# Patient Record
Sex: FEMALE | Race: BLACK OR AFRICAN AMERICAN | Employment: UNEMPLOYED | URBAN - METROPOLITAN AREA
[De-identification: names, ages, dates, MRNs, and addresses within clinical notes are randomized per-mention and may not be internally consistent; named-entity substitution may affect disease eponyms.]

---

## 2017-05-01 ENCOUNTER — HOSPITAL ENCOUNTER (EMERGENCY)
Age: 22
Discharge: HOME | End: 2017-05-01
Attending: EMERGENCY MEDICINE | Admitting: EMERGENCY MEDICINE
Payer: COMMERCIAL

## 2017-05-01 VITALS
HEIGHT: 65 IN | SYSTOLIC BLOOD PRESSURE: 131 MMHG | DIASTOLIC BLOOD PRESSURE: 85 MMHG | TEMPERATURE: 99.9 F | WEIGHT: 200 LBS | HEART RATE: 80 BPM | RESPIRATION RATE: 12 BRPM | OXYGEN SATURATION: 99 % | BODY MASS INDEX: 33.32 KG/M2

## 2017-05-01 DIAGNOSIS — J02.9 VIRAL PHARYNGITIS: Primary | ICD-10-CM

## 2017-05-01 PROCEDURE — 99283 EMERGENCY DEPT VISIT LOW MDM: CPT

## 2017-05-01 PROCEDURE — 87081 CULTURE SCREEN ONLY: CPT | Performed by: EMERGENCY MEDICINE

## 2017-05-01 PROCEDURE — 74011250637 HC RX REV CODE- 250/637: Performed by: EMERGENCY MEDICINE

## 2017-05-01 RX ORDER — IBUPROFEN 600 MG/1
600 TABLET ORAL
Status: COMPLETED | OUTPATIENT
Start: 2017-05-01 | End: 2017-05-01

## 2017-05-01 RX ORDER — DEXAMETHASONE SODIUM PHOSPHATE 4 MG/ML
10 INJECTION, SOLUTION INTRA-ARTICULAR; INTRALESIONAL; INTRAMUSCULAR; INTRAVENOUS; SOFT TISSUE ONCE
Status: COMPLETED | OUTPATIENT
Start: 2017-05-01 | End: 2017-05-01

## 2017-05-01 RX ORDER — IBUPROFEN 600 MG/1
600 TABLET ORAL
Qty: 20 TAB | Refills: 0 | Status: SHIPPED | OUTPATIENT
Start: 2017-05-01

## 2017-05-01 RX ADMIN — DEXAMETHASONE SODIUM PHOSPHATE 10 MG: 4 INJECTION, SOLUTION INTRAMUSCULAR; INTRAVENOUS at 04:49

## 2017-05-01 RX ADMIN — IBUPROFEN 600 MG: 600 TABLET ORAL at 04:49

## 2017-05-01 NOTE — ED NOTES
I have reviewed discharge instructions with the patient. The patient verbalized understanding. Medication teaching given, to include name, dose, action, and side effects. Patient verbalized understanding of medications. Encouraged patient to voice any concerns with reassurance provided. Patient armband removed and shredded    Patient Discharged in stable condition. Patient is awake, alert and oriented x 4.

## 2017-05-01 NOTE — ED PROVIDER NOTES
HPI Comments: 25 y/o female with no PMH presents with sore throat x1 day. Denies cough. Admits to some diarrhea, denies abd pain or vomiting. Has not taken any meds. No sick contacts. No other complaints. Patient is a 24 y.o. female presenting with sore throat. Sore Throat    Associated symptoms include diarrhea and ear pain. Pertinent negatives include no vomiting, no shortness of breath and no trouble swallowing. Past Medical History:   Diagnosis Date    Pneumonia        No past surgical history on file. Family History:   Problem Relation Age of Onset    Hypertension Other     Diabetes Other        Social History     Social History    Marital status: SINGLE     Spouse name: N/A    Number of children: N/A    Years of education: N/A     Occupational History    Not on file. Social History Main Topics    Smoking status: Current Some Day Smoker    Smokeless tobacco: Not on file    Alcohol use No    Drug use: No      Comment: former user    Sexual activity: Yes     Birth control/ protection: None     Other Topics Concern    Not on file     Social History Narrative         ALLERGIES: Penicillins    Review of Systems   Constitutional: Negative for fever. HENT: Positive for ear pain and sore throat. Negative for trouble swallowing. Respiratory: Negative for shortness of breath. Cardiovascular: Negative for chest pain. Gastrointestinal: Positive for diarrhea. Negative for nausea and vomiting. All other systems reviewed and are negative. Vitals:    05/01/17 0414   BP: 131/85   Pulse: 80   Resp: 12   Temp: 99.9 °F (37.7 °C)   SpO2: 99%   Weight: 90.7 kg (200 lb)   Height: 5' 5\" (1.651 m)            Physical Exam   Constitutional: She is oriented to person, place, and time. She appears well-developed and well-nourished. HENT:   Head: Normocephalic and atraumatic. Uvula midline  Moderate tonsillar edema, erythema, no exudates    TM clear BL   Neck: Neck supple.  No JVD present. Musculoskeletal: She exhibits no edema. Lymphadenopathy:     She has no cervical adenopathy. Neurological: She is alert and oriented to person, place, and time. Skin: Skin is warm and dry. No erythema. MDM  Number of Diagnoses or Management Options  Diagnosis management comments: 25 y/o female presents with sore throat  Decadron and nsaids for pain  Rapid strep  Pt well appearing, no drooling, no sign of serious infection. Amount and/or Complexity of Data Reviewed  Clinical lab tests: ordered and reviewed      ED Course       Procedures    Strep negative,   Discussed results with pt. discussed supportive care, expected course of illness. Stable for dc home.

## 2017-05-01 NOTE — LETTER
[address] [place] [address] [facility] 
[address] [facility] [address] [phone] Work/School Note Date: 5/1/2017 To Whom It May concern: 
 
Jesus Alberto Dorsey was seen and treated today in the emergency room by the following provider(s): 
Attending Provider: Sandra Saravia 89 may return to work on 5/3/2017. Sincerely, Praveena Mackay RN

## 2017-05-03 LAB
B-HEM STREP THROAT QL CULT: NEGATIVE
BACTERIA SPEC CULT: NORMAL
SERVICE CMNT-IMP: NORMAL

## 2018-09-04 ENCOUNTER — HOSPITAL ENCOUNTER (EMERGENCY)
Age: 23
Discharge: HOME | End: 2018-09-04
Attending: OBSTETRICS & GYNECOLOGY | Admitting: OBSTETRICS & GYNECOLOGY
Payer: COMMERCIAL

## 2018-09-04 VITALS
TEMPERATURE: 98.6 F | BODY MASS INDEX: 34.99 KG/M2 | WEIGHT: 210 LBS | HEART RATE: 93 BPM | SYSTOLIC BLOOD PRESSURE: 136 MMHG | HEIGHT: 65 IN | DIASTOLIC BLOOD PRESSURE: 82 MMHG

## 2018-09-04 PROBLEM — Z34.90 PREGNANCY: Status: ACTIVE | Noted: 2018-09-04

## 2018-09-04 LAB
A1 MICROGLOB PLACENTAL VAG QL: NEGATIVE
APPEARANCE UR: ABNORMAL
BILIRUB UR QL: NEGATIVE
COLOR UR: ABNORMAL
CONTROL LINE PRESENT?: NORMAL
EXPIRATION DATE: NORMAL
GLUCOSE UR QL STRIP.AUTO: NEGATIVE MG/DL
INTERNAL NEGATIVE CONTROL: NORMAL
KETONES UR-MCNC: NEGATIVE MG/DL
KIT LOT NO.: NORMAL
LEUKOCYTE ESTERASE UR QL STRIP: ABNORMAL
NITRITE UR QL: NEGATIVE
PH UR: 7 [PH] (ref 5–9)
PROT UR QL: 100 MG/DL
RBC # UR STRIP: NEGATIVE /UL
SERVICE CMNT-IMP: ABNORMAL
SERVICE CMNT-IMP: NORMAL
SP GR UR: >1.03 (ref 1–1.02)
UROBILINOGEN UR QL: 0.2 EU/DL (ref 0.2–1)
WET PREP GENITAL: NORMAL

## 2018-09-04 PROCEDURE — 59025 FETAL NON-STRESS TEST: CPT

## 2018-09-04 PROCEDURE — 99285 EMERGENCY DEPT VISIT HI MDM: CPT

## 2018-09-04 PROCEDURE — 87491 CHLMYD TRACH DNA AMP PROBE: CPT | Performed by: OBSTETRICS & GYNECOLOGY

## 2018-09-04 PROCEDURE — 84112 EVAL AMNIOTIC FLUID PROTEIN: CPT | Performed by: OBSTETRICS & GYNECOLOGY

## 2018-09-04 PROCEDURE — 81003 URINALYSIS AUTO W/O SCOPE: CPT

## 2018-09-04 PROCEDURE — 87210 SMEAR WET MOUNT SALINE/INK: CPT | Performed by: OBSTETRICS & GYNECOLOGY

## 2018-09-04 RX ORDER — METRONIDAZOLE 7.5 MG/G
1 GEL VAGINAL
Qty: 187.5 MG | Refills: 0 | Status: SHIPPED | OUTPATIENT
Start: 2018-09-04 | End: 2018-09-09

## 2018-09-04 NOTE — PROGRESS NOTES
Pt is a 25 yr old , arrived by EMS, for possible ruptured membranes. She notes mild irregular. ctxs, no VB, no LOF, positive FM. Her prenatal course has been consistent with [facility]. Has an appointment tomorrow with her OB. Hammett and EFM placed with positive FHT. Abdomen is soft and non tender to palpation. Actimprom was negative. Cervical exam is closed cervix. 1300: Dr Shad Quispe at bedside doing a pelvic exam. 
[phone]: Patient up to restroom. 1427: Dr Shad Quispe at bedside. Patient able to be D/C home and to follow up with scheduled OB appointment tommorow. 1435: I have reviewed discharge instructions with the patient and spouse. The patient and spouse verbalized understanding. Al discharge education and paperwork given, patient had no questions at this time. 1449: Patient ambulated off unit in no acute distress

## 2018-09-04 NOTE — DISCHARGE INSTRUCTIONS
Learning About Starting to Breastfeed  Planning ahead    Before your baby is born, plan ahead. Learn all you can about breastfeeding. This helps make breastfeeding easier. · Early in your pregnancy, talk to your doctor or midwife about breastfeeding. · Learn the basics before your baby is born. The staff at hospitals and birthing centers can help you find a lactation specialist. This person is often a nurse who has been trained to teach and advise women about breastfeeding. Or you can take a breastfeeding class. · Plan ahead for times when you will need help after your baby is born. Many women get help from friends and family. Some join a support group to talk to other moms who breastfeed. · Buy the equipment you'll need. Examples are breast pads, nipple cream, extra pillows, and nursing bras. Find out about breast pumps too. Getting help from your hospital or birthing center  It's important to have support from the doctors, nurses, and hospital staff who care for you and your baby. Before it's time for you to give birth, ask about the breastfeeding policies at your hospital or birthing center. Look for a hospital or birthing center that has policies for:  · \"Rooming in. \" This policy encourages you to have your baby in the room with you. It can allow you to breastfeed more often. · Supplemental feedings. Tell the staff that your baby is to get only your breast milk from birth. If staff feed your baby water, sugar solution, or formula right after birth without a medical reason, it may make it harder for you to breastfeed. · Pacifiers or artificial nipples. Staff should not give your  these items without your permission. They may interfere with breastfeeding. · Follow-up. Find out if your hospital can help you with breastfeeding issues after you go home. See if you can get information on support groups or other contacts.  They might help if you need help setting up and staying with your breastfeeding routine. Your first feeding  It's best to start breastfeeding within 1 hour of birth. For each feeding, you go through these basic steps:  · Get ready for the feeding. Be calm and relaxed, and try not to be distracted. Get some water or juice for yourself. Use two or three pillows to help support your baby while he or she is nursing. · Find a breastfeeding position that is comfortable for you and your baby. Examples are the cradle and the football positions. Make sure the baby's head and chest are lined up straight and facing your breast. It's best to switch which breast you start with each time. · Get the baby latched on well. Your baby's mouth needs to be wide open, like a yawn, so you may need to gently touch the middle of your baby's lower lip. When your baby's mouth is open wide, quickly bring the baby onto your nipple and areola. The areola is the dark [place] around your nipple. · Provide a complete feeding. Let your baby nurse for at least 15 minutes. Be sure to burp your baby after each breast.  In the first days after birth, your breasts make a thick, yellow liquid called colostrum. This liquid gives your baby nutrients and antibodies against infection. It is all that babies need at first. Your breasts will fill with milk a few days after the birth. Talk to your doctor, midwife, or lactation specialist right away if you are having problems and aren't sure what to do. How often to breastfeed  Plan to breastfeed your baby on demand rather than setting a strict schedule. For the first few days, be prepared to breastfeed every 1 to 3 hours. That often works out to about 8 to 12 times in a 24-hour period. Wake a sleeping baby to feed, if you need to. If you breastfeed more often, it will help your breasts to produce more milk. After you go home  After you're home, don't be afraid to call your doctor, midwife, or lactation specialist with questions.  That's true even if you don't know what's bothering you. They are used to parents of newborns calling. They can help you figure out if there is a problem, and if so, how to fix it. Plan for times when you will be apart from your baby. Use a breast pump to collect breast milk ahead of time. You can store milk in the refrigerator or freezer. Then it's ready when someone else will be taking care of your baby. Breastfeeding is a learned skill that gets easier over time. You are more likely to succeed if you plan ahead, learn the basic techniques, and know where to get help and support. Where can you learn more? Go to http://tanner-christen.info/. Enter M190 in the search box to learn more about \"Learning About Starting to Breastfeed. \"  Current as of: November 21, 2017  Content Version: 11.7  © 6500-1641 [employer]. Care instructions adapted under license by [employer] (which disclaims liability or warranty for this information). If you have questions about a medical condition or this instruction, always ask your healthcare professional. [place] [address] any warranty or liability for your use of this information.     Prenatal Discharge Instructions  Follow up appointment: WITH regular OB    Diet:     Activity:     Medications:     Call your physician or return to Labor and Delivery if any of the following symptoms occur:   Signs of labor (contractions every 5-10 minutes for 1 hour)   Vaginal bleeding   Rupture of amniotic membranes (water breaks)   Fever   Decreased fetal movement (less than 5-10 movements in 1 hour)

## 2018-09-04 NOTE — H&P
History & Physical 
 
Name: Derick Fuentes MRN: [id number]  SSN: [id number] YOB: 1995  Age: 25 y.o. Sex: female Subjective:  
 
Estimated Date of Delivery: None noted. OB History  Para Term  AB Living  
 1 SAB TAB Ectopic Molar Multiple Live Births Ms. Jessi Heath presents for evaluation of pregnancy at 32.4wks for irregular contractions, and r/o rupture of membranes. Pt states she was at work when started having low back and abdominal pain, and menstrual cramps. She also noted that her underwear was wet. She c/o white liquidly vaginal discharge. She td bleeding. +FM. Prenatal course was normal.She sees [facility] for prenatal care. Please see prenatal records for details. Past Medical History:  
Diagnosis Date  Pneumonia No past surgical history on file. Allergies Allergen Reactions  Penicillins Hives and Swelling Prior to Admission medications Medication Sig Start Date End Date Taking? Authorizing Provider  
multivit-mins no. 63-iron-folic (M-VIT) 27 mg iron- 1 mg tab Take  by mouth. Yes Historical Provider  
metroNIDAZOLE (METROGEL) 0.75 % vaginal gel Insert 1 Applicator into vagina nightly for 5 days. 18 Yes Hubert Justice MD  
ibuprofen (MOTRIN) 600 mg tablet Take 1 Tab by mouth every six (6) hours as needed for Pain. 17   Erich Isaacs DO  
etonogestrel (NEXPLANON) 68 mg impl by SubDERmal route. Brianne Clark MD  
fluticasone (FLONASE) 50 mcg/actuation nasal spray 2 Sprays by Both Nostrils route daily. Phys MD Eduardo  
dextromethorphan-guaiFENesin (ROBITUSSIN-DM)  mg/5 mL syrup Take 10 mL by mouth every six (6) hours as needed for Cough. 16   Eliezer Quintanilla PA-C Social History Occupational History  Not on file. Social History Main Topics  Smoking status: Current Some Day Smoker  Smokeless tobacco: Not on file  Alcohol use No  
  Drug use: No  
   Comment: former user  Sexual activity: Yes Birth control/ protection: None Family History Problem Relation Age of Onset  Hypertension Other  Diabetes Other Review of Systems: A comprehensive review of systems was negative except for that written in the HPI. Objective:  
 
Vitals: 
Vitals:  
 09/04/18 1201 09/04/18 1202 BP:  130/83 Pulse:  98 Temp:  98.6 °F (37 °C) Weight: 210 lb (95.3 kg) Height: 5' 5\" (1.651 m) Physical Exam: 
Patient without distress. Heart: Regular rate and rhythm, S1S2 present or without murmur or extra heart sounds Lung: clear to auscultation throughout lung fields, no wheezes, no rales, no rhonchi and normal respiratory effort Abdomen: soft, nontender, nondistended Fundus: soft and non tender Cervical Exam: Closed/Thick/High x 2 exams,  Thin white discharge noted on exam.  
Lower Extremities: no calf tenderness Membranes:  Intact, Actim prom neg Fetal Heart Rate: Reactive Baseline: 145 per minute Variability: moderate Accelerations: yes Decelerations: none Uterine contractions: none Prenatal Labs:  
No results found for: RUBELLAEXT, GRBSEXT, HBSAGEXT, HIVEXT, RPREXT, GONNOEXT, CHLAMEXT Recent Results (from the past 24 hour(s)) RUPTURE OF FETAL MEMBRANES, POC Collection Time: 09/04/18 12:20 PM  
Result Value Ref Range Rupture of fetal membrane Negative Negative Control line present? Acceptable Internal negative control Acceptable Kit Lot No. [id number] Expiration date 07/24/19 POC URINE MACROSCOPIC Collection Time: 09/04/18 12:36 PM  
Result Value Ref Range Color OTHER Appearance CLOUDY Spec. gravity (POC) >1.030 (H) 1.001 - 1.023  
 pH, urine  (POC) 7.0 5.0 - 9.0 Protein (POC) 100 (A) NEG mg/dL Glucose, urine (POC) NEGATIVE  NEG mg/dL Ketones (POC) NEGATIVE  NEG mg/dL Bilirubin (POC) NEGATIVE  NEG  Blood (POC) NEGATIVE  NEG    
 Urobilinogen (POC) 0.2 0.2 - 1.0 EU/dL Nitrite (POC) NEGATIVE  NEG Leukocyte esterase (POC) SMALL (A) NEG Performed by Milan Pearce WET PREP Collection Time: 09/04/18  1:25 PM  
Result Value Ref Range Special Requests: NO SPECIAL REQUESTS Wet prep NO TRICHOMONAS SEEN Wet prep NO YEAST SEEN Wet prep MANY 
CLUE CELLS PRESENT Assessment/Plan:  
 
Patient Active Problem List  
Diagnosis Code  Pregnancy Z34.90 Plan: Tory Luana No evidence of labor Ruled out for rupture Wet prep: +BV--->meds sent to pharmacy Lower abdominal pain/back pain: discussed tylenol, warm compresses, and maternity belt Reassuring fetal status D/C home with PTL precautions F/u 1 days with primary OB, pt has an appointment tomorrow.   
 
Signed By:  Susie Tello MD   
 September 4, 2018 2:27 PM

## 2018-09-04 NOTE — IP AVS SNAPSHOT
[address] Nnamdi Nixon 17 Patient: Wisam Smith MRN: [id number] :1995 A check pradeep indicates which time of day the medication should be taken. My Medications START taking these medications Instructions Each Dose to Equal  
 Morning Noon Evening Bedtime  
 metroNIDAZOLE 0.75 % vaginal gel Commonly known as:  Sabina Joe Your last dose was: Your next dose is: Insert 1 Applicator into vagina nightly for 5 days. 1 Applicator ASK your doctor about these medications Instructions Each Dose to Equal  
 Morning Noon Evening Bedtime  
 dextromethorphan-guaiFENesin  mg/5 mL syrup Commonly known as:  ROBITUSSIN-DM Your last dose was: Your next dose is: Take 10 mL by mouth every six (6) hours as needed for Cough. 10 mL FLONASE 50 mcg/actuation nasal spray Generic drug:  fluticasone Your last dose was: Your next dose is: 2 Sprays by Both Nostrils route daily. 2 Spray  
    
   
   
   
  
 ibuprofen 600 mg tablet Commonly known as:  MOTRIN Your last dose was: Your next dose is: Take 1 Tab by mouth every six (6) hours as needed for Pain. 600 mg  
    
   
   
   
  
 M-VIT 27 mg iron- 1 mg Tab Generic drug:  multivit-mins no. 63-iron-folic Your last dose was: Your next dose is: Take  by mouth. NEXPLANON 68 mg Impl Generic drug:  etonogestrel Your last dose was: Your next dose is:    
   
   
 by SubDERmal route. Where to Get Your Medications These medications were sent to [facility] [address], [address] [facility] [address] Hours:  24-hours Phone:  [phone] metroNIDAZOLE 0.75 % vaginal gel

## 2018-09-04 NOTE — IP AVS SNAPSHOT
[address] Patient: Vladislav Zaragoza MRN: [id number] :1995 About your hospitalization You were admitted on:  N/A You last received care in the:  [facility] 2 [address] [facility] You were discharged on:  2018 Why you were hospitalized Your primary diagnosis was:  Not on File Your diagnoses also included:  Pregnancy Follow-up Information Follow up With Details Comments Contact Info DEBBIE Caraballo [address] [place] [address] [phone] 
[phone] Discharge Orders None A check pradeep indicates which time of day the medication should be taken. My Medications START taking these medications Instructions Each Dose to Equal  
 Morning Noon Evening Bedtime  
 metroNIDAZOLE 0.75 % vaginal gel Commonly known as:  Hermon Peals Your last dose was: Your next dose is: Insert 1 Applicator into vagina nightly for 5 days. 1 Applicator ASK your doctor about these medications Instructions Each Dose to Equal  
 Morning Noon Evening Bedtime  
 dextromethorphan-guaiFENesin  mg/5 mL syrup Commonly known as:  ROBITUSSIN-DM Your last dose was: Your next dose is: Take 10 mL by mouth every six (6) hours as needed for Cough. 10 mL FLONASE 50 mcg/actuation nasal spray Generic drug:  fluticasone Your last dose was: Your next dose is: 2 Sprays by Both Nostrils route daily. 2 Spray  
    
   
   
   
  
 ibuprofen 600 mg tablet Commonly known as:  MOTRIN Your last dose was: Your next dose is: Take 1 Tab by mouth every six (6) hours as needed for Pain. 600 mg  
    
   
   
   
  
 M-VIT 27 mg iron- 1 mg Tab Generic drug:  multivit-mins no. 63-iron-folic Your last dose was: Your next dose is: Take  by mouth. NEXPLANON 68 mg Impl Generic drug:  etonogestrel Your last dose was: Your next dose is:    
   
   
 by SubDERmal route. Where to Get Your Medications These medications were sent to [facility] [address], [address] Hours:  24-hours Phone:  [phone]  
  metroNIDAZOLE 0.75 % vaginal gel Discharge Instructions Learning About Starting to Breastfeed Planning ahead Before your baby is born, plan ahead. Learn all you can about breastfeeding. This helps make breastfeeding easier. · Early in your pregnancy, talk to your doctor or midwife about breastfeeding. · Learn the basics before your baby is born. The staff at hospitals and birthing centers can help you find a lactation specialist. This person is often a nurse who has been trained to teach and advise women about breastfeeding. Or you can take a breastfeeding class. · Plan ahead for times when you will need help after your baby is born. Many women get help from friends and family. Some join a support group to talk to other moms who breastfeed. · Buy the equipment you'll need. Examples are breast pads, nipple cream, extra pillows, and nursing bras. Find out about breast pumps too. Getting help from your hospital or birthing center It's important to have support from the doctors, nurses, and hospital staff who care for you and your baby. Before it's time for you to give birth, ask about the breastfeeding policies at your hospital or birthing center. Look for a hospital or birthing center that has policies for: · \"Rooming in. \" This policy encourages you to have your baby in the room with you. It can allow you to breastfeed more often. · Supplemental feedings.  Tell the staff that your baby is to get only your breast milk from birth. If staff feed your baby water, sugar solution, or formula right after birth without a medical reason, it may make it harder for you to breastfeed. · Pacifiers or artificial nipples. Staff should not give your  these items without your permission. They may interfere with breastfeeding. · Follow-up. Find out if your hospital can help you with breastfeeding issues after you go home. See if you can get information on support groups or other contacts. They might help if you need help setting up and staying with your breastfeeding routine. Your first feeding It's best to start breastfeeding within 1 hour of birth. For each feeding, you go through these basic steps: · Get ready for the feeding. Be calm and relaxed, and try not to be distracted. Get some water or juice for yourself. Use two or three pillows to help support your baby while he or she is nursing. · Find a breastfeeding position that is comfortable for you and your baby. Examples are the cradle and the football positions. Make sure the baby's head and chest are lined up straight and facing your breast. It's best to switch which breast you start with each time. · Get the baby latched on well. Your baby's mouth needs to be wide open, like a yawn, so you may need to gently touch the middle of your baby's lower lip. When your baby's mouth is open wide, quickly bring the baby onto your nipple and areola. The areola is the dark [place] around your nipple. · Provide a complete feeding. Let your baby nurse for at least 15 minutes. Be sure to burp your baby after each breast. 
In the first days after birth, your breasts make a thick, yellow liquid called colostrum. This liquid gives your baby nutrients and antibodies against infection. It is all that babies need at first. Your breasts will fill with milk a few days after the birth.  
Talk to your doctor, midwife, or lactation specialist right away if you are having problems and aren't sure what to do. How often to breastfeed Plan to breastfeed your baby on demand rather than setting a strict schedule. For the first few days, be prepared to breastfeed every 1 to 3 hours. That often works out to about 8 to 12 times in a 24-hour period. Wake a sleeping baby to feed, if you need to. If you breastfeed more often, it will help your breasts to produce more milk. After you go home After you're home, don't be afraid to call your doctor, midwife, or lactation specialist with questions. That's true even if you don't know what's bothering you. They are used to parents of newborns calling. They can help you figure out if there is a problem, and if so, how to fix it. Plan for times when you will be apart from your baby. Use a breast pump to collect breast milk ahead of time. You can store milk in the refrigerator or freezer. Then it's ready when someone else will be taking care of your baby. Breastfeeding is a learned skill that gets easier over time. You are more likely to succeed if you plan ahead, learn the basic techniques, and know where to get help and support. Where can you learn more? Go to http://tanner-christen.info/. Enter A310 in the search box to learn more about \"Learning About Starting to Breastfeed. \" Current as of: November 21, 2017 Content Version: 11.7 © 3482-8631 Novalux. Care instructions adapted under license by MyUnfold (which disclaims liability or warranty for this information). If you have questions about a medical condition or this instruction, always ask your healthcare professional. [address] any warranty or liability for your use of this information. Prenatal Discharge Instructions Follow up appointment: WITH regular OB Diet:  
 
Activity:  
 
Medications:  
 
Call your physician or return to Labor and Delivery if any of the following symptoms occur: ? Signs of labor (contractions every 5-10 minutes for 1 hour) ? Vaginal bleeding ? Rupture of amniotic membranes (water breaks) ? Fever ? Decreased fetal movement (less than 5-10 movements in 1 hour) Introducing [facility]! Leyda Hernadez introduces Re.nooble patient portal. Now you can access parts of your medical record, email your doctor's office, and request medication refills online. 1. In your internet browser, go to [email or web address] 2. Click on the First Time User? Click Here link in the Sign In box. You will see the New Member Sign Up page. 3. Enter your Re.nooble Access Code exactly as it appears below. You will not need to use this code after youve completed the sign-up process. If you do not sign up before the expiration date, you must request a new code. · Re.nooble Access Code: [email or web address] Expires: 12/3/2018  2:33 PM 
 
4. Enter the last four digits of your Social Security Number (xxxx) and Date of Birth (mm/dd/yyyy) as indicated and click Submit. You will be taken to the next sign-up page. 5. Create a Re.nooble ID. This will be your Re.nooble login ID and cannot be changed, so think of one that is secure and easy to remember. 6. Create a Re.nooble password. You can change your password at any time. 7. Enter your Password Reset Question and Answer. This can be used at a later time if you forget your password. 8. Enter your e-mail address. You will receive e-mail notification when new information is available in [address] 9. Click Sign Up. You can now view and download portions of your medical record. 10. Click the Download Summary menu link to download a portable copy of your medical information. If you have questions, please visit the Frequently Asked Questions section of the Re.nooble website. Remember, Re.nooble is NOT to be used for urgent needs. For medical emergencies, dial 911. Now available from your iPhone and Android! Introducing Miller Salinas As a [facility] patient, I wanted to make you aware of our electronic visit tool called Miller Salinas. Gordo Campbell 24/7 allows you to connect within minutes with a medical provider 24 hours a day, seven days a week via a mobile device or tablet or logging into a secure website from your computer. You can access Miller Salinas from anywhere in the [place]. A virtual visit might be right for you when you have a simple condition and feel like you just dont want to get out of bed, or cant get away from work for an appointment, when your regular [facility] provider is not available (evenings, weekends or holidays), or when youre out of town and need minor care. Electronic visits cost only $49 and if the [facility] 24/7 provider determines a prescription is needed to treat your condition, one can be electronically transmitted to a nearby pharmacy*. Please take a moment to enroll today if you have not already done so. The enrollment process is free and takes just a few minutes. To enroll, please download the [facility] 24/7 hugo to your tablet or phone, or visit [email or web address] to enroll on your computer. And, as an [address] [place] [address] patient with a [employer]Kast account, the results of your visits will be scanned into your electronic medical record and your primary care provider will be able to view the scanned results. We urge you to continue to see your regular [facility] provider for your ongoing medical care. And while your primary care provider may not be the one available when you seek a Miller Salinas virtual visit, the peace of mind you get from getting a real diagnosis real time can be priceless. For more information on Miller Salinas, view our Frequently Asked Questions (FAQs) at [email or web address]. Sincerely, 
 
Brianna Jessica MD 
Chief Medical Officer Britany8 Clarissa Vicente *:  certain medications cannot be prescribed via Miller Salinas Unresulted Labs-Please follow up with your PCP about these lab tests Order Current Status CHLAMYDIA/NEISSERIA AMPLIFICATION In process Providers Seen During Your Hospitalization Provider Specialty Primary office phone Kassandra Hall MD Obstetrics & Gynecology [phone] Your Primary Care Physician (PCP) Primary Care Physician Office Phone Office Fax Cuate Santana [phone] You are allergic to the following Allergen Reactions Penicillins Hives Swelling Recent Documentation Height Weight BMI OB Status Smoking Status 1.651 m 95.3 kg 34.95 kg/m2 Pregnant Current Some Day Smoker Emergency Contacts Name Discharge Info Relation Home Work Mobile [facility] [place] DISCHARGE CAREGIVER [3] Parent [1] [phone] Patient Belongings The following personal items are in your possession at time of discharge: 
                             
 
  
  
Discharge Instructions Attachments/References PREGNANCY: BACK PAIN (ENGLISH) PREGNANCY: ABDOMINAL PAIN (ENGLISH) PREGNANCY: WEEKS 32 TO 34 (ENGLISH) Patient Handouts Backache During Pregnancy: Care Instructions Your Care Instructions Back pain has many possible causes. It is often caused by problems with muscles and ligaments in your back. The extra weight during pregnancy can put stress on your back. Moving, lifting, standing, sitting, or sleeping in an awkward way also can strain your back. Back pain can also be a sign of labor. Although it may hurt a lot, back pain often improves on its own. Use good home treatment, and take care not to stress your back. Follow-up care is a key part of your treatment and safety.  Be sure to make and go to all appointments, and call your doctor if you are having problems. It's also a good idea to know your test results and keep a list of the medicines you take. How can you care for yourself at home? · Ask your doctor about taking acetaminophen (Tylenol) for pain. Do not take aspirin, ibuprofen (Advil, Motrin), or naproxen (Aleve). · Do not take two or more pain medicines at the same time unless the doctor told you to. Many pain medicines have acetaminophen, which is Tylenol. Too much acetaminophen (Tylenol) can be harmful. · Lie on your side with your knees and hips bent and a pillow between your legs. This reduces stress on your back. · Put ice or cold packs on your back for 10 to 20 minutes at a time, several times a day. Put a thin cloth between the ice and your skin. · Warm baths may also help reduce pain. · Change positions every 30 minutes. Take breaks if you must sit for a long time. Get up and walk around. · Ask your doctor about how much exercise you can do. You may feel better taking short walks or doing gentle movements and stretching in a swimming pool. · Ask your doctor about exercises to stretch and strengthen your back. When should you call for help? Call your doctor now or seek immediate medical care if: 
  · You think you are in labor.  
  · You have new numbness in your buttocks, genital or rectal areas, or legs.  
  · You have a new loss of bowel or bladder control.  
 Watch closely for changes in your health, and be sure to contact your doctor if: 
  · You do not get better as expected. Where can you learn more? Go to http://tanner-christen.info/. Enter X849 in the search box to learn more about \"Backache During Pregnancy: Care Instructions. \" Current as of: November 21, 2017 Content Version: 11.7 © 8144-0896 UVLrx Therapeutics, Abacus e-[employer]. Care instructions adapted under license by Globe Icons Interactive (which disclaims liability or warranty for this information).  If you have questions about a medical condition or this instruction, always ask your healthcare professional. [place] [address] any warranty or liability for your use of this information. Belly Pain in Pregnancy: Care Instructions Your Care Instructions When you're pregnant, any belly pain can be a worry. You may not want to call your doctor about every pain you have. But you don't want to miss something that is dangerous for you or your baby. Even if it feels familiar, belly pain can mean something new when you're pregnant. It's important to know when to call your doctor. It will also help to know how to care for yourself at home when your pain is not caused by anything harmful. · When belly pain is more severe or constant, see a doctor right away. · If you're sure your belly pain is a sign of labor, call your doctor. · When belly pain is brief, it's usually a normal part of pregnancy. It might be related to changes in the growing uterus. Or it could be the stretching of ligaments called round ligaments. These ligaments help support the uterus. Round ligament pain can be on either side of your belly. It can also be felt in your hips or groin. Follow-up care is a key part of your treatment and safety. Be sure to make and go to all appointments, and call your doctor if you are having problems. It's also a good idea to know your test results and keep a list of the medicines you take. How can you tell if belly pain is a sign of labor? When belly pain is caused by labor, it can feel like mild or menstrual-like cramps in your lower belly. These cramps are probably contractions. They can happen in your second or third trimester. You may also have: · A steady, dull ache in your lower back, pelvis, or thighs. · A feeling of pressure in your pelvis or lower belly. · Changes in your vaginal discharge or a sudden release of fluid from the vagina. If you think you are in labor, call your doctor. How can you care for yourself at home? When belly pain is mild and is not a symptom of labor: · Rest until you feel better. · Take a warm bath. · Think about what you drink and eat: ¨ Drink plenty of fluids. Choose water and other caffeine-free clear liquids until you feel better. ¨ Try eating small, frequent meals. If your stomach is upset, try bland, low-fat foods like plain rice, broiled chicken, toast, and yogurt. · Think about how you move if you are having brief pains from stretching of the round ligaments. ¨ Try gentle stretching. ¨ Move a little more slowly when turning in bed or getting up from a chair, so those ligaments don't stretch quickly. ¨ Lean forward a bit if you think you are going to cough or sneeze. When should you call for help? Call 911 anytime you think you may need emergency care. For example, call if: 
  · You have sudden, severe pain in your belly.  
  · You have severe vaginal bleeding.  
 Call your doctor now or seek immediate medical care if: 
  · You have new or worse belly pain or cramping.  
  · You have any vaginal bleeding.  
  · You have a fever.  
  · You have symptoms of preeclampsia, such as: 
¨ Sudden swelling of your face, hands, or feet. ¨ New vision problems (such as dimness or blurring). ¨ A severe headache.  
  · You think that you may be in labor. This means that you've had at least 8 contractions within 1 hour or at least 4 contractions within 20 minutes, even after you change your position and drink fluids.  
  · You have symptoms of a urinary tract infection. These may include: 
¨ Pain or burning when you urinate. ¨ A frequent need to urinate without being able to pass much urine. ¨ Pain in the flank, which is just below the rib cage and above the waist on either side of the back. ¨ Blood in your urine.  
 Watch closely for changes in your health, and be sure to contact your doctor if you are worried about your or your baby's health. Where can you learn more? Go to http://tanner-christen.info/. Enter 805 302 731 in the search box to learn more about \"Belly Pain in Pregnancy: Care Instructions. \" Current as of: 2017 Content Version: 11.7 © 4815-6448 Dude Solutions. Care instructions adapted under license by SkyFuel (which disclaims liability or warranty for this information). If you have questions about a medical condition or this instruction, always ask your healthcare professional. [address] any warranty or liability for your use of this information. Weeks 32 to 34 of Your Pregnancy: Care Instructions Your Care Instructions During the last few weeks of your pregnancy, you may have more aches and pains. It's important to rest when you can. Your growing baby is putting more pressure on your bladder. So you may need to urinate more often. Hemorrhoids are also common. These are painful, itchy veins in the rectal area. In the 36th week, most women have a test for group B streptococcus (GBS). GBS is a common bacteria that can live in the vagina and rectum. It can make your baby sick after birth. If you test positive, you will get antibiotics during labor. These will keep your baby from getting the bacteria. You may want to talk with your doctor about banking your baby's umbilical cord blood. This is the blood left in the cord after birth. If you want to save this blood, you must arrange it ahead of time. You can't decide at the last minute. If you haven't already had the Tdap shot during this pregnancy, talk to your doctor about getting it. It will help protect your  against pertussis infection. Follow-up care is a key part of your treatment and safety. Be sure to make and go to all appointments, and call your doctor if you are having problems. It's also a good idea to know your test results and keep a list of the medicines you take. How can you care for yourself at home? Ease hemorrhoids · Get more liquids, fruits, vegetables, and fiber in your diet. This will help keep your stools soft. · Avoid sitting for too long. Lie on your left side several times a day. · Clean yourself with soft, moist toilet paper. Or you can use witch hazel pads or personal hygiene pads. · If you are uncomfortable, try ice packs. Or you can sit in a warm sitz bath. Do these for 20 minutes at a time, as needed. · Use hydrocortisone cream for pain and itching. Two examples are Anusol and Preparation H Hydrocortisone. · Ask your doctor about taking an over-the-counter stool softener. Consider breastfeeding · Experts recommend that women breastfeed for 1 year or longer. Breast milk is the perfect food for babies. · Breast milk is easier for babies to digest than formula. And it is always available, just the right temperature, and free. · Breast milk may help protect your child from some health problems.  babies are less likely than formula-fed babies to: ¨ Get ear infections, colds, diarrhea, and pneumonia. ¨ Be obese or get diabetes later in life. · Women who breastfeed have less bleeding after the birth. Their uteruses also shrink back faster. · Some women who breastfeed lose weight faster. Making milk burns calories. · Breastfeeding can lower your risk of breast cancer, ovarian cancer, and osteoporosis. Decide about circumcision for boys · As you make this decision, it may help to think about your personal, Zoroastrian, and family traditions. You get to decide if you will keep your son's penis natural or if he will be circumcised. · If you decide that you would like to have your baby circumcised, talk with your doctor. You can share your concerns about pain. And you can discuss your preferences for anesthesia. Where can you learn more? Go to http://tanner-christen.info/. Enter N462 in the search box to learn more about \"Weeks 32 to 34 of Your Pregnancy: Care Instructions. \" Current as of: November 21, 2017 Content Version: 11.7 © 2006-2018 [facility]. Care instructions adapted under license by Eventstagr.am (which disclaims liability or warranty for this information). If you have questions about a medical condition or this instruction, always ask your healthcare professional. [address] any warranty or liability for your use of this information. Please provide this summary of care documentation to your next provider. Signatures-by signing, you are acknowledging that this After Visit Summary has been reviewed with you and you have received a copy. Patient Signature:  ____________________________________________________________ Date:  ____________________________________________________________  
  
[facility] Provider Signature:  ____________________________________________________________ Date:  ____________________________________________________________

## 2018-09-05 LAB
C TRACH RRNA SPEC QL NAA+PROBE: NEGATIVE
N GONORRHOEA RRNA SPEC QL NAA+PROBE: NEGATIVE
SPECIMEN SOURCE: NORMAL

## 2018-09-07 ENCOUNTER — TELEPHONE (OUTPATIENT)
Dept: OBGYN CLINIC | Age: 23
End: 2018-09-07

## 2018-09-07 NOTE — TELEPHONE ENCOUNTER
Call made to the pt using two identifiers,name and . Pt made aware that her swab came back negative for any STD'S. Pt verbalized understanding.

## 2018-09-07 NOTE — TELEPHONE ENCOUNTER
----- Message from Lawrence Calvillo MD sent at 9/5/2018 11:18 PM EDT -----  Please let patient know that her chlamydia and Gonorrhea swabs were negative.